# Patient Record
Sex: MALE | Race: WHITE | NOT HISPANIC OR LATINO | Employment: UNEMPLOYED | URBAN - NONMETROPOLITAN AREA
[De-identification: names, ages, dates, MRNs, and addresses within clinical notes are randomized per-mention and may not be internally consistent; named-entity substitution may affect disease eponyms.]

---

## 2022-06-11 ENCOUNTER — APPOINTMENT (OUTPATIENT)
Dept: CT IMAGING | Facility: HOSPITAL | Age: 52
End: 2022-06-11

## 2022-06-11 ENCOUNTER — APPOINTMENT (OUTPATIENT)
Dept: GENERAL RADIOLOGY | Facility: HOSPITAL | Age: 52
End: 2022-06-11

## 2022-06-11 ENCOUNTER — HOSPITAL ENCOUNTER (EMERGENCY)
Facility: HOSPITAL | Age: 52
Discharge: HOME | End: 2022-06-11
Attending: STUDENT IN AN ORGANIZED HEALTH CARE EDUCATION/TRAINING PROGRAM | Admitting: STUDENT IN AN ORGANIZED HEALTH CARE EDUCATION/TRAINING PROGRAM

## 2022-06-11 VITALS
SYSTOLIC BLOOD PRESSURE: 157 MMHG | DIASTOLIC BLOOD PRESSURE: 69 MMHG | RESPIRATION RATE: 16 BRPM | BODY MASS INDEX: 41.75 KG/M2 | HEIGHT: 73 IN | OXYGEN SATURATION: 95 % | HEART RATE: 75 BPM | WEIGHT: 315 LBS | TEMPERATURE: 98 F

## 2022-06-11 DIAGNOSIS — F41.9 ANXIETY: ICD-10-CM

## 2022-06-11 DIAGNOSIS — R10.84 GENERALIZED ABDOMINAL PAIN: Primary | ICD-10-CM

## 2022-06-11 LAB
ALBUMIN SERPL-MCNC: 4.7 G/DL (ref 3.5–5.2)
ALBUMIN/GLOB SERPL: 1.6 G/DL
ALP SERPL-CCNC: 69 U/L (ref 39–117)
ALT SERPL W P-5'-P-CCNC: 54 U/L (ref 1–41)
ANION GAP SERPL CALCULATED.3IONS-SCNC: 17 MMOL/L (ref 5–15)
AST SERPL-CCNC: 39 U/L (ref 1–40)
BASOPHILS # BLD AUTO: 0.06 10*3/MM3 (ref 0–0.2)
BASOPHILS NFR BLD AUTO: 0.8 % (ref 0–1.5)
BILIRUB SERPL-MCNC: 0.9 MG/DL (ref 0–1.2)
BILIRUB UR QL STRIP: NEGATIVE
BUN SERPL-MCNC: 14 MG/DL (ref 6–20)
BUN/CREAT SERPL: 13 (ref 7–25)
CALCIUM SPEC-SCNC: 9.8 MG/DL (ref 8.6–10.5)
CHLORIDE SERPL-SCNC: 101 MMOL/L (ref 98–107)
CLARITY UR: CLEAR
CO2 SERPL-SCNC: 20 MMOL/L (ref 22–29)
COLOR UR: YELLOW
CREAT SERPL-MCNC: 1.08 MG/DL (ref 0.76–1.27)
D DIMER PPP FEU-MCNC: 0.3 MCGFEU/ML (ref 0–0.5)
DEPRECATED RDW RBC AUTO: 41.9 FL (ref 37–54)
EGFRCR SERPLBLD CKD-EPI 2021: 83.6 ML/MIN/1.73
EOSINOPHIL # BLD AUTO: 0.25 10*3/MM3 (ref 0–0.4)
EOSINOPHIL NFR BLD AUTO: 3.5 % (ref 0.3–6.2)
ERYTHROCYTE [DISTWIDTH] IN BLOOD BY AUTOMATED COUNT: 12.7 % (ref 12.3–15.4)
GLOBULIN UR ELPH-MCNC: 2.9 GM/DL
GLUCOSE SERPL-MCNC: 106 MG/DL (ref 65–99)
GLUCOSE UR STRIP-MCNC: NEGATIVE MG/DL
HCT VFR BLD AUTO: 49.2 % (ref 37.5–51)
HGB BLD-MCNC: 15.7 G/DL (ref 13–17.7)
HGB UR QL STRIP.AUTO: NEGATIVE
IMM GRANULOCYTES # BLD AUTO: 0.01 10*3/MM3 (ref 0–0.05)
IMM GRANULOCYTES NFR BLD AUTO: 0.1 % (ref 0–0.5)
KETONES UR QL STRIP: ABNORMAL
LEUKOCYTE ESTERASE UR QL STRIP.AUTO: NEGATIVE
LIPASE SERPL-CCNC: 16 U/L (ref 13–60)
LYMPHOCYTES # BLD AUTO: 2.39 10*3/MM3 (ref 0.7–3.1)
LYMPHOCYTES NFR BLD AUTO: 33.6 % (ref 19.6–45.3)
MCH RBC QN AUTO: 29.1 PG (ref 26.6–33)
MCHC RBC AUTO-ENTMCNC: 31.9 G/DL (ref 31.5–35.7)
MCV RBC AUTO: 91.1 FL (ref 79–97)
MONOCYTES # BLD AUTO: 0.69 10*3/MM3 (ref 0.1–0.9)
MONOCYTES NFR BLD AUTO: 9.7 % (ref 5–12)
NEUTROPHILS NFR BLD AUTO: 3.72 10*3/MM3 (ref 1.7–7)
NEUTROPHILS NFR BLD AUTO: 52.3 % (ref 42.7–76)
NITRITE UR QL STRIP: NEGATIVE
NRBC BLD AUTO-RTO: 0 /100 WBC (ref 0–0.2)
PH UR STRIP.AUTO: <=5 [PH] (ref 5–8)
PLATELET # BLD AUTO: 238 10*3/MM3 (ref 140–450)
PMV BLD AUTO: 10.7 FL (ref 6–12)
POTASSIUM SERPL-SCNC: 3.7 MMOL/L (ref 3.5–5.2)
PROT SERPL-MCNC: 7.6 G/DL (ref 6–8.5)
PROT UR QL STRIP: NEGATIVE
QT INTERVAL: 382 MS
QTC INTERVAL: 472 MS
RBC # BLD AUTO: 5.4 10*6/MM3 (ref 4.14–5.8)
SARS-COV-2 RNA PNL SPEC NAA+PROBE: NOT DETECTED
SODIUM SERPL-SCNC: 138 MMOL/L (ref 136–145)
SP GR UR STRIP: 1.01 (ref 1–1.03)
TROPONIN T SERPL-MCNC: <0.01 NG/ML (ref 0–0.03)
UROBILINOGEN UR QL STRIP: ABNORMAL
WBC NRBC COR # BLD: 7.12 10*3/MM3 (ref 3.4–10.8)

## 2022-06-11 PROCEDURE — 93010 ELECTROCARDIOGRAM REPORT: CPT | Performed by: INTERNAL MEDICINE

## 2022-06-11 PROCEDURE — 87635 SARS-COV-2 COVID-19 AMP PRB: CPT | Performed by: STUDENT IN AN ORGANIZED HEALTH CARE EDUCATION/TRAINING PROGRAM

## 2022-06-11 PROCEDURE — 71250 CT THORAX DX C-: CPT

## 2022-06-11 PROCEDURE — 99283 EMERGENCY DEPT VISIT LOW MDM: CPT

## 2022-06-11 PROCEDURE — 74176 CT ABD & PELVIS W/O CONTRAST: CPT

## 2022-06-11 PROCEDURE — 81003 URINALYSIS AUTO W/O SCOPE: CPT | Performed by: STUDENT IN AN ORGANIZED HEALTH CARE EDUCATION/TRAINING PROGRAM

## 2022-06-11 PROCEDURE — 85025 COMPLETE CBC W/AUTO DIFF WBC: CPT | Performed by: STUDENT IN AN ORGANIZED HEALTH CARE EDUCATION/TRAINING PROGRAM

## 2022-06-11 PROCEDURE — 84484 ASSAY OF TROPONIN QUANT: CPT | Performed by: STUDENT IN AN ORGANIZED HEALTH CARE EDUCATION/TRAINING PROGRAM

## 2022-06-11 PROCEDURE — 93005 ELECTROCARDIOGRAM TRACING: CPT | Performed by: STUDENT IN AN ORGANIZED HEALTH CARE EDUCATION/TRAINING PROGRAM

## 2022-06-11 PROCEDURE — 80053 COMPREHEN METABOLIC PANEL: CPT | Performed by: STUDENT IN AN ORGANIZED HEALTH CARE EDUCATION/TRAINING PROGRAM

## 2022-06-11 PROCEDURE — 71045 X-RAY EXAM CHEST 1 VIEW: CPT

## 2022-06-11 PROCEDURE — 83690 ASSAY OF LIPASE: CPT | Performed by: STUDENT IN AN ORGANIZED HEALTH CARE EDUCATION/TRAINING PROGRAM

## 2022-06-11 PROCEDURE — 36415 COLL VENOUS BLD VENIPUNCTURE: CPT

## 2022-06-11 PROCEDURE — 85379 FIBRIN DEGRADATION QUANT: CPT | Performed by: STUDENT IN AN ORGANIZED HEALTH CARE EDUCATION/TRAINING PROGRAM

## 2022-06-11 RX ADMIN — SODIUM CHLORIDE, POTASSIUM CHLORIDE, SODIUM LACTATE AND CALCIUM CHLORIDE 1000 ML: 600; 310; 30; 20 INJECTION, SOLUTION INTRAVENOUS at 04:36

## 2022-06-11 NOTE — DISCHARGE INSTRUCTIONS
It was very nice to meet you, Keith. Thank you for allowing us to take care of you today at [facility]. Please be aware that you were seen during a time of global shortage of iodinated contrast media. This means an alternative approach to your diagnosis and treatment may have been employed in order to provide optimal care during this shortage.    You were evaluated in the ER for multiple complaints including abdominal pain, chest pain, and blood pressure issues. Your work-up today did not show any emergent findings or emergent indications for admission to the hospital. While it is unclear what exactly is the cause of your symptoms, please understand that an ER evaluation is considered to be just the start of your evaluation. We will do what we can in one visit, but we are often unable to fully figure out what is causing your symptoms from one evaluation. Thus our primary goal is to determine whether you need to be evaluated in the hospital or if it is safe for you to go home and see other doctors such as a primary care physician or a specialist on an outpatient basis. A copy of your results should be included in your paperwork.     It is VERY IMPORTANT that you follow up (call them to set up an appointment) with your primary care doctor* within the next few days or as soon as possible so that you can be re-evaluated for improvement in your symptoms or for any other questions. If you were prescribed any medications, please take them as directed or call us back with any questions.     Please return to the emergency room within 12-48 hours if you experience fever, chills, chest pain or shortness of breath, pain with inspiration/expiration, pain that travels to your arms, neck or back, nausea, vomiting, severe headache, tearing pain in your chest, dizziness, feel as though you are about to pass out, have any worsening symptoms, or any other concerns.

## 2022-06-11 NOTE — ED NOTES
"Upon Dr. Patterson discharging the patient, the patient began getting hostile stating \"You're telling me there's nothing wrong with me when I've been feeling like this for the past 2 weeks\". Pt was informed there was nothing found that required emergency intervention. Pt began yelling \"Well I want another dr\" and using profanities. Security called to stand outside door. When this nurse entered room to discharge patient, he continued to say \"where's the other nearest hospital? I guess this is one of the hospitals that think just cause I'm not bleeding out doesn't mean its not important\" Education provided. Pt discharged peacefully.   "

## 2022-06-24 NOTE — ED PROVIDER NOTES
Subjective   Patient states that he has been having abdominal pain for 2 weeks.  He states that he has been trying enemas because nothing is coming out.  States that he is also had significantly decreased oral intake.  States that he is not sure what is going on but he needs answers today.  Denies any chest pain, shortness of breath, hematuria, hematochezia, melena.  He states that he did not have anywhere to take his dog so he brought it into the ER with him.  States that he is not having any numbness, tingling, saddle paresthesias, or weight loss.  He states that he has a history of small bowel obstruction and also is having mild shortness of breath.          Review of Systems   All other systems reviewed and are negative.      History reviewed. No pertinent past medical history.    Allergies   Allergen Reactions   • Penicillins Rash       History reviewed. No pertinent surgical history.    History reviewed. No pertinent family history.    Social History     Socioeconomic History   • Marital status:            Objective   Physical Exam  Vitals and nursing note reviewed.   Constitutional:       General: He is not in acute distress.     Appearance: He is not toxic-appearing or diaphoretic.   HENT:      Head: Normocephalic and atraumatic.      Nose: Nose normal.   Eyes:      General:         Right eye: No discharge.         Left eye: No discharge.      Extraocular Movements: Extraocular movements intact.      Conjunctiva/sclera: Conjunctivae normal.   Cardiovascular:      Rate and Rhythm: Normal rate and regular rhythm.      Pulses: Normal pulses.   Pulmonary:      Effort: Pulmonary effort is normal. No respiratory distress.      Breath sounds: No stridor. No rhonchi.   Abdominal:      General: Abdomen is flat.      Tenderness: There is abdominal tenderness (mild, generalized). There is no guarding or rebound.   Musculoskeletal:         General: Normal range of motion.      Cervical back: Normal range of  motion. No rigidity.   Skin:     General: Skin is warm.      Capillary Refill: Capillary refill takes less than 2 seconds.   Neurological:      General: No focal deficit present.      Mental Status: He is alert and oriented to person, place, and time.      Cranial Nerves: No cranial nerve deficit.      Sensory: No sensory deficit.      Motor: No weakness.   Psychiatric:         Thought Content: Thought content normal.         Judgment: Judgment normal.      Comments: Hostile mood and splitting present         Procedures           ED Course                                           MDM  Number of Diagnoses or Management Options  Anxiety  Generalized abdominal pain  Diagnosis management comments: This is a 52-year-old male presenting today with multiple complaints including shortness of breath, constipation and anxiety.  Given his history and physical examination plan to obtain a CBC, CMP, lipase, UA as well as an EKG, troponin, CT chest, CT abdomen pelvis without contrast. This patient was evaluated during a time of global shortage of iodinated contrast media. Based on guidance from the [employer], best practices, and local institutional approaches an alternative path for evaluating and managing the patient may have been employed in order to provide optimal care during this shortage. The current situation has been discussed with the patient.  His work-up is largely unremarkable.  Patient is demanding answers tonight for symptoms that been going on for longer than 2 weeks.  He has began to get very hostile towards staff and is using profanities and racial slurs.  I told him that we have evaluated for multiple emergent medical conditions that we have not found any evidence of so far. I reassessed the patient and discussed the findings of the work up so far. I told him that if his symptoms worsen or he has other questions he needs to return to the ER. I answered all his questions regarding the  emergency department evaluation, diagnosis, and treatment plan in plain and simple language that he was able to understand.     He voiced agreement with the plan of care so far and had no further questions. I told him that there is always some diagnostic uncertainty in the ER and that his work up, physical exam, and even his current presentation may not always reveal other underlying conditions. I also went over the fact that his condition may change or show itself after being discharged. He expressed understanding and agreed that there are reasonable limitations with the practice of emergency medicine.    I gave him return precautions and told him to return to the emergency department within 24 - 48hrs if he has any new, worsening, or concerning symptoms.     I told him that it is VERY IMPORTANT that he follows up (by calling to set up an appointment) with his primary care doctor within the next few days or as soon as reasonably possible so that he can be re-evaluated for improvement in his symptoms or for any other questions. He verbalized understanding of these instructions.     He was discharged in stable condition and was observed ambulating out of the ER.          Amount and/or Complexity of Data Reviewed  Clinical lab tests: reviewed and ordered  Tests in the radiology section of CPT®: ordered and reviewed  Tests in the medicine section of CPT®: reviewed        Final diagnoses:   Generalized abdominal pain   Anxiety       ED Disposition  ED Disposition     ED Disposition   Discharge    Condition   Stable    Comment   --             Provider, No Known  [facility]  [place] [address]  [phone]    Call in 1 day  As needed, If symptoms worsen         Medication List      No changes were made to your prescriptions during this visit.          Dorian Patterson MD  06/23/22 3433

## 2023-02-15 ENCOUNTER — TELEPHONE (OUTPATIENT)
Dept: SURGERY | Age: 53
End: 2023-02-15

## 2023-02-15 ENCOUNTER — HOSPITAL ENCOUNTER (EMERGENCY)
Age: 53
Discharge: HOME | End: 2023-02-15
Attending: EMERGENCY MEDICINE
Payer: MEDICAID

## 2023-02-15 ENCOUNTER — APPOINTMENT (OUTPATIENT)
Dept: CT IMAGING | Age: 53
End: 2023-02-15
Payer: MEDICAID

## 2023-02-15 VITALS
WEIGHT: 260 LBS | SYSTOLIC BLOOD PRESSURE: 152 MMHG | DIASTOLIC BLOOD PRESSURE: 93 MMHG | HEIGHT: 73 IN | BODY MASS INDEX: 34.46 KG/M2 | OXYGEN SATURATION: 93 % | RESPIRATION RATE: 18 BRPM | TEMPERATURE: 98.2 F | HEART RATE: 92 BPM

## 2023-02-15 DIAGNOSIS — R10.9 RECURRENT ABDOMINAL PAIN: Primary | ICD-10-CM

## 2023-02-15 LAB
ALBUMIN SERPL-MCNC: 4.3 G/DL (ref 3.5–5.2)
ALP BLD-CCNC: 83 U/L (ref 40–130)
ALT SERPL-CCNC: 20 U/L (ref 5–41)
ANION GAP SERPL CALCULATED.3IONS-SCNC: 11 MMOL/L (ref 7–19)
AST SERPL-CCNC: 17 U/L (ref 5–40)
BASOPHILS ABSOLUTE: 0.1 K/UL (ref 0–0.2)
BASOPHILS RELATIVE PERCENT: 0.8 % (ref 0–1)
BILIRUB SERPL-MCNC: 0.7 MG/DL (ref 0.2–1.2)
BILIRUBIN URINE: NEGATIVE
BLOOD, URINE: NEGATIVE
BUN BLDV-MCNC: 15 MG/DL (ref 6–20)
CALCIUM SERPL-MCNC: 9.2 MG/DL (ref 8.6–10)
CHLORIDE BLD-SCNC: 106 MMOL/L (ref 98–111)
CLARITY: CLEAR
CO2: 23 MMOL/L (ref 22–29)
COLOR: YELLOW
CREAT SERPL-MCNC: 1.1 MG/DL (ref 0.5–1.2)
EKG P AXIS: 47 DEGREES
EKG P-R INTERVAL: 194 MS
EKG Q-T INTERVAL: 394 MS
EKG QRS DURATION: 102 MS
EKG QTC CALCULATION (BAZETT): 416 MS
EKG T AXIS: 24 DEGREES
EOSINOPHILS ABSOLUTE: 0.2 K/UL (ref 0–0.6)
EOSINOPHILS RELATIVE PERCENT: 2.5 % (ref 0–5)
GFR SERPL CREATININE-BSD FRML MDRD: >60 ML/MIN/{1.73_M2}
GLUCOSE BLD-MCNC: 102 MG/DL (ref 74–109)
GLUCOSE URINE: NEGATIVE MG/DL
HCT VFR BLD CALC: 46.7 % (ref 42–52)
HEMOGLOBIN: 15.7 G/DL (ref 14–18)
IMMATURE GRANULOCYTES #: 0 K/UL
KETONES, URINE: ABNORMAL MG/DL
LEUKOCYTE ESTERASE, URINE: NEGATIVE
LIPASE: 19 U/L (ref 13–60)
LYMPHOCYTES ABSOLUTE: 2.1 K/UL (ref 1.1–4.5)
LYMPHOCYTES RELATIVE PERCENT: 29.6 % (ref 20–40)
MCH RBC QN AUTO: 29 PG (ref 27–31)
MCHC RBC AUTO-ENTMCNC: 33.6 G/DL (ref 33–37)
MCV RBC AUTO: 86.3 FL (ref 80–94)
MONOCYTES ABSOLUTE: 0.8 K/UL (ref 0–0.9)
MONOCYTES RELATIVE PERCENT: 10.6 % (ref 0–10)
NEUTROPHILS ABSOLUTE: 4 K/UL (ref 1.5–7.5)
NEUTROPHILS RELATIVE PERCENT: 56.4 % (ref 50–65)
NITRITE, URINE: NEGATIVE
PDW BLD-RTO: 12.6 % (ref 11.5–14.5)
PH UA: 5 (ref 5–8)
PLATELET # BLD: 258 K/UL (ref 130–400)
PMV BLD AUTO: 10.3 FL (ref 9.4–12.4)
POTASSIUM SERPL-SCNC: 4.1 MMOL/L (ref 3.5–5)
PROTEIN UA: NEGATIVE MG/DL
RBC # BLD: 5.41 M/UL (ref 4.7–6.1)
SODIUM BLD-SCNC: 140 MMOL/L (ref 136–145)
SPECIFIC GRAVITY UA: 1.02 (ref 1–1.03)
TOTAL PROTEIN: 7.3 G/DL (ref 6.6–8.7)
UROBILINOGEN, URINE: 0.2 E.U./DL
WBC # BLD: 7.1 K/UL (ref 4.8–10.8)

## 2023-02-15 PROCEDURE — 6360000004 HC RX CONTRAST MEDICATION: Performed by: EMERGENCY MEDICINE

## 2023-02-15 PROCEDURE — 93005 ELECTROCARDIOGRAM TRACING: CPT | Performed by: EMERGENCY MEDICINE

## 2023-02-15 PROCEDURE — 83690 ASSAY OF LIPASE: CPT

## 2023-02-15 PROCEDURE — 81003 URINALYSIS AUTO W/O SCOPE: CPT

## 2023-02-15 PROCEDURE — 74177 CT ABD & PELVIS W/CONTRAST: CPT

## 2023-02-15 PROCEDURE — 85025 COMPLETE CBC W/AUTO DIFF WBC: CPT

## 2023-02-15 PROCEDURE — 2580000003 HC RX 258: Performed by: EMERGENCY MEDICINE

## 2023-02-15 PROCEDURE — 80053 COMPREHEN METABOLIC PANEL: CPT

## 2023-02-15 PROCEDURE — 99285 EMERGENCY DEPT VISIT HI MDM: CPT

## 2023-02-15 PROCEDURE — 36415 COLL VENOUS BLD VENIPUNCTURE: CPT

## 2023-02-15 RX ORDER — MAGNESIUM HYDROXIDE/ALUMINUM HYDROXICE/SIMETHICONE 120; 1200; 1200 MG/30ML; MG/30ML; MG/30ML
5 SUSPENSION ORAL EVERY 6 HOURS PRN
COMMUNITY
End: 2023-02-16 | Stop reason: ALTCHOICE

## 2023-02-15 RX ORDER — SODIUM CHLORIDE, SODIUM LACTATE, POTASSIUM CHLORIDE, AND CALCIUM CHLORIDE .6; .31; .03; .02 G/100ML; G/100ML; G/100ML; G/100ML
1000 INJECTION, SOLUTION INTRAVENOUS ONCE
Status: COMPLETED | OUTPATIENT
Start: 2023-02-15 | End: 2023-02-15

## 2023-02-15 RX ADMIN — SODIUM CHLORIDE, POTASSIUM CHLORIDE, SODIUM LACTATE AND CALCIUM CHLORIDE 1000 ML: 600; 310; 30; 20 INJECTION, SOLUTION INTRAVENOUS at 07:31

## 2023-02-15 RX ADMIN — IOPAMIDOL 70 ML: 755 INJECTION, SOLUTION INTRAVENOUS at 08:09

## 2023-02-15 ASSESSMENT — ENCOUNTER SYMPTOMS
ABDOMINAL PAIN: 1
BLOOD IN STOOL: 0
DIARRHEA: 1
CONSTIPATION: 0
SHORTNESS OF BREATH: 0
SORE THROAT: 0
NAUSEA: 0
EYE DISCHARGE: 0
APNEA: 0
FACIAL SWELLING: 0
VOICE CHANGE: 0
SINUS PRESSURE: 0
CHOKING: 0

## 2023-02-15 NOTE — DISCHARGE INSTRUCTIONS
Suggest you continue an acid reducer until follow-up and further evaluation. Establish with primary care such as [facility]. You can also make an appointment with gastroenterology. You will probably need ultrasound and HIDA scan and possibly endoscopy. Return to the emergency department if bleeding or fever. Or intractable vomiting such as an obstruction.
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
## 2023-02-15 NOTE — ED NOTES
Pt is gowned. Patient placed on cardiac monitor, continuous pulse oximeter, and NIBP monitor.  Monitor alarms on.       Jean Pierre Mathias RN  02/15/23 8923

## 2023-02-16 ENCOUNTER — OFFICE VISIT (OUTPATIENT)
Dept: GASTROENTEROLOGY | Age: 53
End: 2023-02-16
Payer: MEDICAID

## 2023-02-16 VITALS
SYSTOLIC BLOOD PRESSURE: 160 MMHG | BODY MASS INDEX: 32.98 KG/M2 | OXYGEN SATURATION: 98 % | HEART RATE: 104 BPM | WEIGHT: 257 LBS | HEIGHT: 74 IN | DIASTOLIC BLOOD PRESSURE: 60 MMHG

## 2023-02-16 DIAGNOSIS — R13.10 DYSPHAGIA, UNSPECIFIED TYPE: Primary | ICD-10-CM

## 2023-02-16 DIAGNOSIS — R10.30 LOWER ABDOMINAL PAIN: ICD-10-CM

## 2023-02-16 DIAGNOSIS — K21.9 GASTROESOPHAGEAL REFLUX DISEASE, UNSPECIFIED WHETHER ESOPHAGITIS PRESENT: ICD-10-CM

## 2023-02-16 PROCEDURE — 99204 OFFICE O/P NEW MOD 45 MIN: CPT | Performed by: NURSE PRACTITIONER

## 2023-02-16 RX ORDER — OMEPRAZOLE 40 MG/1
40 CAPSULE, DELAYED RELEASE ORAL DAILY
Qty: 30 CAPSULE | Refills: 11 | Status: SHIPPED | OUTPATIENT
Start: 2023-02-16

## 2023-02-16 ASSESSMENT — ENCOUNTER SYMPTOMS
COUGH: 0
ABDOMINAL PAIN: 1
NAUSEA: 0
DIARRHEA: 0
ABDOMINAL DISTENTION: 0
VOMITING: 0
SHORTNESS OF BREATH: 0
RECTAL PAIN: 0
CONSTIPATION: 0
BLOOD IN STOOL: 0
TROUBLE SWALLOWING: 0
ANAL BLEEDING: 0
CHOKING: 0
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
## 2023-02-16 NOTE — PATIENT INSTRUCTIONS
Schedule colonoscopy and endoscopy. Do not eat or drink after midnight the day of the procedure. Allowed medications can be taken with a small sip of water. Please review your prep instructions for allowed medications. You will not be able to drive for 24 hours after the procedure due to sedation. Must have a responsible adult, 18 years or older, accompany you to drive you home the day of your procedure. If you are on blood thinners, clearance from the prescribing physician will be obtained before your procedure is scheduled. If it is determined it is not safe to hold these medications for a short time an alternative procedure for evaluation may be recommended. No aspirin, ibuprofen, naproxen, fish oil or vitamin E for 5 days before procedure. Risks of colonoscopy and endoscopy include, but are not limited to, perforation, bleeding, and infection, Risk of perforation and bleeding are increased if there is a polyp removed or dilation completed. Anesthesia risks will be reviewed with you before the procedure by a member of the anesthesia department. Your physician may also schedule a follow up appointment with the nurse practitioner to discuss pathology, symptoms or to check if you have had any problems related to your procedure. If you prefer not to return to the office after your procedure please discuss this with your physician on the day of your colonoscopy. The physician will talk with you and/or your family after the procedure is completed. Final recommendations are based on the pathologist report if biopsies or specimens are taken. If polyps are removed during the procedure they will be sent to a pathologist for analysis. Unless you have a follow up appointment scheduled, you will be notified by mail of the pathology results within 4 weeks. If you have not received results after 4 weeks you may call the office to obtain this information. For Colonoscopy:   You will be given specific directions regarding restrictions to diet and bowel prep instructions including laxatives. Please read these instructions one week prior to your scheduled procedure to ensure that you are prepared. If you have any questions regarding these instructions please call our office Mon through Fri from 8:00 am to 4:00 pm.     Follow prep instructions provided for bowel prep. Take all of the bowel prep as directed. If you are having problems with nausea, stop your prep for 30-45 min to allow the nausea to subside before resuming your prep. It is important to drink plenty of fluids throughout the day before taking your laxatives. This will help to protect your kidneys, prevent dehydration and maximize the effect of the bowel prep. Your diet before a colonoscopy bowel preparation is very important to ensure a successful colon exam. It is recommended to consider certain changes to your diet three to four days prior to the procedure. Remember that your bowels need to be completely empty for the exam.    What foods are good to eat? Cut down on heavy solid foods three to four days before the procedure and start introducing lighter meals to your diet. The following food suggestions are a good part of your diet before a colonoscopy bowel preparation. Light meat that is easily digestible such as chicken (without the skin)   Potatoes without skin   Cheese   Eggs   A light meal of steamed white fish   Light clear soups    Foods and drinks to avoid  Avoid foods that contain too much fiber. Stay clear of dark colored beverages. They can stick to the walls of the digestive tract and make it difficult to differentiate from blood.  Some of these foods are:  Red meat, rice, nuts and vegetables   Milk, other milk based fluids and cream   Most fruit and puddings   Whole grain pasta   Cereals, bran and seeds   Colored beverages, especially those that are red or purple in color   Red colored Jell-O   On the day before the colonoscopy, continue to drink plenty of clear fluids. It is important   to keep yourself hydrated before the exam.     Please follow all instructions as provided for cleansing the bowel. Failure to have an adequately prepped colon may cause you to have incomplete exam with further testing required.      [email or web address]

## 2023-03-06 ENCOUNTER — TELEMEDICINE (OUTPATIENT)
Dept: GASTROENTEROLOGY | Age: 53
End: 2023-03-06
Payer: MEDICAID

## 2023-03-06 DIAGNOSIS — R13.10 DYSPHAGIA, UNSPECIFIED TYPE: Primary | ICD-10-CM

## 2023-03-06 DIAGNOSIS — R10.13 EPIGASTRIC PAIN: ICD-10-CM

## 2023-03-06 PROCEDURE — 99214 OFFICE O/P EST MOD 30 MIN: CPT | Performed by: NURSE PRACTITIONER

## 2023-03-06 ASSESSMENT — ENCOUNTER SYMPTOMS
SHORTNESS OF BREATH: 0
VOMITING: 0
COUGH: 0
ABDOMINAL DISTENTION: 0
ANAL BLEEDING: 0
TROUBLE SWALLOWING: 1
DIARRHEA: 0
CONSTIPATION: 0
CHOKING: 0
NAUSEA: 0
BLOOD IN STOOL: 0
ABDOMINAL PAIN: 0
RECTAL PAIN: 0

## 2023-03-06 NOTE — PROGRESS NOTES
Florencio Esqueda, was evaluated through a synchronous (real-time) audio-video encounter. The patient (or guardian if applicable) is aware that this is a billable service, which includes applicable co-pays. This Virtual Visit was conducted with patient's (and/or legal guardian's) consent. The visit was conducted pursuant to the emergency declaration under the [facility], [facility] authority and the [employer] and [email or web address] General Act. Patient identification was verified, and a caregiver was present when appropriate. The patient was located at Home: [address] [place] [id number]  Provider was located at [facility] ([place] [address]): [address] [place] [address] on 3/6/2023 at 11:45 AM    An electronic signature was used to authenticate this note. Subjective:     Patient ID: Florencio Esqueda is a 50 y.o. male  PCP: No primary care provider on file. Referring Provider: No ref. provider found    HPI  Patient presents to the office today with the following complaints: Dysphagia    Patient seen per video visit, states he was unable complete the prep for his colonoscopy, states he got very nauseated and vomited and was not able to hold the prep down. He does report the right sided pain he was feeling has resolved. Reports he would like to reschedule the egd, but he is not able to prep for the colonoscopy. Reports he his having very dark stools almost black. Reports that he is having difficulty swallowing also and he has had to bring food back up that would not go down. Assessment:     1. Dysphagia, unspecified type  2. Epigastric pain         Plan:   Schedule EGD  Nothing to eat or drink after midnight. No driving for 24 hours after procedure. Bring a  to procedure. No aspirin, NSAIDs, fish oil 5 days before procedure.   I have discussed the benefits, alternatives, and risks (including bleeding, perforation and death)  for pursuing Endoscopy (EGD/Colonscopy/EUS/ERCP) with the patient and they are willing to continue. We also discussed the need for anesthesia, IV access, proper dietary changes, medication changes if necessary, and need for bowel prep (if ordered) prior to their Endoscopic procedure. They are aware they must have someone accompany them to their scheduled procedure to drive them home - they agree to the above and are willing to continue. Orders  No orders of the defined types were placed in this encounter. Medications  No orders of the defined types were placed in this encounter. Patient History:     No past medical history on file. No past surgical history on file. Family History   Problem Relation Age of Onset    Cancer Mother 39        brain    Lung Cancer Mother     Cancer Brother 39    Lung Cancer Brother     Colon Cancer Neg Hx     Colon Polyps Neg Hx        Social History     Socioeconomic History    Marital status:    Tobacco Use    Smoking status: Former     Types: Cigarettes     Quit date: 2022     Years since quittin.8    Smokeless tobacco: Never   Vaping Use    Vaping Use: Former   Substance and Sexual Activity    Alcohol use: Not Currently     Comment: not since 26 yrs of age    Drug use: Never       Current Outpatient Medications   Medication Sig Dispense Refill    Alum & Mag Hydroxide-Simeth (MYLANTA PO) Take by mouth daily With every meal      omeprazole (PRILOSEC) 40 MG delayed release capsule Take 1 capsule by mouth daily Take first thing daily on an empty stomach. 30 capsule 11    bismuth subsalicylate (PEPTO BISMOL) 262 MG/15ML suspension Take 15 mLs by mouth every 6 hours as needed for Indigestion       No current facility-administered medications for this visit. Allergies   Allergen Reactions    Penicillins Rash     As a child       Review of Systems   Constitutional:  Negative for activity change, appetite change, fatigue, fever and unexpected weight change.   HENT:  Positive for trouble swallowing.    Respiratory:  Negative for cough, choking and shortness of breath.    Cardiovascular:  Negative for chest pain.   Gastrointestinal:  Negative for abdominal distention, abdominal pain, anal bleeding, blood in stool, constipation, diarrhea, nausea, rectal pain and vomiting.   Allergic/Immunologic: Negative for food allergies.   All other systems reviewed and are negative.      Objective:     There were no vitals taken for this visit.    Physical Exam  Vitals reviewed.   Constitutional:       General: He is not in acute distress.     Appearance: He is well-developed.   HENT:      Head: Normocephalic and atraumatic.      Right Ear: External ear normal.      Left Ear: External ear normal.      Nose: Nose normal.   Eyes:      General: No scleral icterus.        Right eye: No discharge.         Left eye: No discharge.      Conjunctiva/sclera: Conjunctivae normal.      Pupils: Pupils are equal, round, and reactive to light.   Cardiovascular:      Heart sounds: No murmur heard.  Pulmonary:      Effort: Pulmonary effort is normal. No respiratory distress.      Breath sounds: No wheezing or rales.   Abdominal:      General: There is no distension.      Palpations: There is no mass.      Tenderness: There is no abdominal tenderness. There is no guarding or rebound.   Musculoskeletal:         General: Normal range of motion.      Cervical back: Normal range of motion and neck supple.   Skin:     Coloration: Skin is not pale.   Neurological:      Mental Status: He is alert and oriented to person, place, and time.   Psychiatric:         Behavior: Behavior normal.

## 2023-03-06 NOTE — PATIENT INSTRUCTIONS
Upper GI Endoscopy: Before Your Procedure    What is an upper GI endoscopy? An upper gastrointestinal (or GI) endoscopy is a test that allows your doctor to look at the inside of your esophagus, stomach, and the first part of your small intestine, called the duodenum. The esophagus is the tube that carries food to your stomach. The doctor uses a thin, lighted tube that bends. It is called an endoscope, or scope. The doctor puts the tip of the scope in your mouth and gently moves it down your throat. The scope is a flexible video camera. The doctor looks at a monitor (like a TV set or a computer screen) as he or she moves the scope. A doctor may do this procedure to look for ulcers, tumors, infection, or bleeding. It also can be used to look for signs of acid backing up into your esophagus. This is called gastroesophageal reflux disease, or GERD. The doctor can use the scope to take a sample of tissue for study (a biopsy). The doctor also can use the scope to take out growths or stop bleeding. How do you prepare for the procedure? Procedures can be stressful. This information will help you understand what you can expect. And it will help you safely prepare for your procedure. Preparing for the procedure    Do not eat or drink anything for 6 to 8 hours before the test. An empty stomach helps your doctor see your stomach clearly during the test. It also reduces your chances of vomiting. If you vomit, there is a small risk that the vomit could enter your lungs. (This is called aspiration.) If the test is done in an emergency, a tube may be inserted through your nose or mouth to empty your stomach. Do not take sucralfate (Carafate) or antacids on the day of the test. These medicines can make it hard for your doctor to see your upper GI tract. If your doctor tells you to, stop taking iron supplements 7 to 14 days before the test.     Be sure you have someone to take you home.  Anesthesia and pain medicine will make it unsafe for you to drive or get home on your own. Understand exactly what procedure is planned, along with the risks, benefits, and other options. Tell your doctor ALL the medicines, vitamins, supplements, and herbal remedies you take. Some may increase the risk of problems during your procedure. Your doctor will tell you if you should stop taking any of them before the procedure and how soon to do it. If you take a medicine that prevents blood clots, your doctor may tell you to stop taking it before your procedure. Or your doctor may tell you to keep taking it. (These medicines include aspirin and other blood thinners.) Make sure that you understand exactly what your doctor wants you to do. Make sure your doctor and the hospital have a copy of your advance directive. If you don't have one, you may want to prepare one. It lets others know your health care wishes. It's a good thing to have before any type of surgery or procedure. What happens on the day of the procedure? Follow the instructions exactly about when to stop eating and drinking. If you don't, your procedure may be canceled. If your doctor told you to take your medicines on the day of the procedure, take them with only a sip of water. Take a bath or shower before you come in for your procedure. Do not apply lotions, perfumes, deodorants, or nail polish. Take off all jewelry and piercings. And take out contact lenses, if you wear them. At the hospital or surgery center   Bring a picture ID. The test may take 15 to 30 minutes. The doctor may spray medicine on the back of your throat to numb it. You also will get medicine to prevent pain and to relax you. You will lie on your left side. The doctor will put the scope in your mouth and toward the back of your throat. The doctor will tell you when to swallow. This helps the scope move down your throat. You will be able to breathe normally.  The doctor will move the scope down your esophagus into your stomach. The doctor also may look at the duodenum. If your doctor wants to take a sample of tissue for a biopsy, he or she may use small surgical tools, which are put into the scope, to cut off some tissue. You will not feel a biopsy, if one is taken. The doctor also can use the tools to stop bleeding or to do other treatments, if needed. You will stay at the hospital or surgery center for 1 to 2 hours until the medicine you were given wears off. What happens after an upper GI endoscopy? After the test, you may belch and feel bloated for a while. You may have a tickling, dry throat or mouth. You may feel a bit hoarse, and you may have a mild sore throat. These symptoms may last several days. Throat lozenges and warm saltwater gargles can help relieve the throat symptoms. Ask your doctor when you can drive again. Your doctor will tell you when you can go back to your usual diet and activities. Don't drink alcohol for 12 to 24 hours after the test.   When should you call your doctor? You have questions or concerns. You don't understand how to prepare for your procedure. You become ill before the procedure (such as fever, flu, or a cold). You need to reschedule or have changed your mind about having the procedure. Where can you learn more? Go to http://www.woods.com/ and enter P790 to learn more about \"Upper GI Endoscopy: Before Your Procedure. \"  Current as of: June 6, 2022               Content Version: 13.5  © 2006-2022 [employer]. Care instructions adapted under license by [facility] ([facility]). If you have questions about a medical condition or this instruction, always ask your healthcare professional. [place] [address] any warranty or liability for your use of this information.

## 2023-03-14 ENCOUNTER — ANESTHESIA EVENT (OUTPATIENT)
Dept: OPERATING ROOM | Age: 53
End: 2023-03-14

## 2023-03-14 ENCOUNTER — TELEPHONE (OUTPATIENT)
Dept: GASTROENTEROLOGY | Age: 53
End: 2023-03-14

## 2023-03-14 ENCOUNTER — HOSPITAL ENCOUNTER (OUTPATIENT)
Age: 53
Setting detail: OUTPATIENT SURGERY
Discharge: HOME | End: 2023-03-14
Attending: INTERNAL MEDICINE | Admitting: INTERNAL MEDICINE
Payer: MEDICAID

## 2023-03-14 ENCOUNTER — APPOINTMENT (OUTPATIENT)
Dept: OPERATING ROOM | Age: 53
End: 2023-03-14

## 2023-03-14 ENCOUNTER — ANESTHESIA (OUTPATIENT)
Dept: OPERATING ROOM | Age: 53
End: 2023-03-14

## 2023-03-14 ENCOUNTER — HOSPITAL ENCOUNTER (OUTPATIENT)
Age: 53
Setting detail: SPECIMEN
Discharge: HOME | End: 2023-03-14
Payer: MEDICAID

## 2023-03-14 VITALS
WEIGHT: 263 LBS | BODY MASS INDEX: 33.75 KG/M2 | DIASTOLIC BLOOD PRESSURE: 47 MMHG | HEART RATE: 68 BPM | TEMPERATURE: 97 F | RESPIRATION RATE: 16 BRPM | SYSTOLIC BLOOD PRESSURE: 104 MMHG | HEIGHT: 74 IN | OXYGEN SATURATION: 96 %

## 2023-03-14 PROCEDURE — 88305 TISSUE EXAM BY PATHOLOGIST: CPT

## 2023-03-14 PROCEDURE — 43239 EGD BIOPSY SINGLE/MULTIPLE: CPT

## 2023-03-14 PROCEDURE — 43239 EGD BIOPSY SINGLE/MULTIPLE: CPT | Performed by: INTERNAL MEDICINE

## 2023-03-14 PROCEDURE — 43450 DILATE ESOPHAGUS 1/MULT PASS: CPT

## 2023-03-14 PROCEDURE — 43450 DILATE ESOPHAGUS 1/MULT PASS: CPT | Performed by: INTERNAL MEDICINE

## 2023-03-14 RX ORDER — ONDANSETRON 2 MG/ML
4 INJECTION INTRAMUSCULAR; INTRAVENOUS
Status: CANCELLED | OUTPATIENT
Start: 2023-03-14 | End: 2023-03-15

## 2023-03-14 RX ORDER — SODIUM CHLORIDE, SODIUM LACTATE, POTASSIUM CHLORIDE, CALCIUM CHLORIDE 600; 310; 30; 20 MG/100ML; MG/100ML; MG/100ML; MG/100ML
INJECTION, SOLUTION INTRAVENOUS CONTINUOUS
Status: DISCONTINUED | OUTPATIENT
Start: 2023-03-14 | End: 2023-03-14 | Stop reason: HOSPADM

## 2023-03-14 RX ORDER — PROPOFOL 10 MG/ML
INJECTION, EMULSION INTRAVENOUS PRN
Status: DISCONTINUED | OUTPATIENT
Start: 2023-03-14 | End: 2023-03-14 | Stop reason: SDUPTHER

## 2023-03-14 RX ORDER — LIDOCAINE HYDROCHLORIDE 10 MG/ML
INJECTION, SOLUTION EPIDURAL; INFILTRATION; INTRACAUDAL; PERINEURAL PRN
Status: DISCONTINUED | OUTPATIENT
Start: 2023-03-14 | End: 2023-03-14 | Stop reason: SDUPTHER

## 2023-03-14 RX ADMIN — PROPOFOL 200 MG: 10 INJECTION, EMULSION INTRAVENOUS at 09:53

## 2023-03-14 RX ADMIN — LIDOCAINE HYDROCHLORIDE 30 MG: 10 INJECTION, SOLUTION EPIDURAL; INFILTRATION; INTRACAUDAL; PERINEURAL at 09:53

## 2023-03-14 RX ADMIN — SODIUM CHLORIDE, SODIUM LACTATE, POTASSIUM CHLORIDE, CALCIUM CHLORIDE: 600; 310; 30; 20 INJECTION, SOLUTION INTRAVENOUS at 08:53

## 2023-03-14 NOTE — ANESTHESIA PRE PROCEDURE
Department of Anesthesiology  Preprocedure Note       Name:  Fernando Garcia   Age:  50 y.o.  :  1970                                          MRN:  [id number]         Date:  3/14/2023      Surgeon: Umesh Arias):  Timmy Regalado MD    Procedure: Procedure(s):  EGD BIOPSY    Medications prior to admission:   Prior to Admission medications    Medication Sig Start Date End Date Taking? Authorizing Provider   Alum & Mag Hydroxide-Simeth (MYLANTA PO) Take by mouth daily With every meal    Historical Provider, MD   omeprazole (PRILOSEC) 40 MG delayed release capsule Take 1 capsule by mouth daily Take first thing daily on an empty stomach. 23   CessCAMILA Killian   bismuth subsalicylate (PEPTO BISMOL) 262 MG/15ML suspension Take 15 mLs by mouth every 6 hours as needed for Indigestion    Historical Provider, MD       Current medications:    Current Facility-Administered Medications   Medication Dose Route Frequency Provider Last Rate Last Admin    lactated ringers IV soln infusion   IntraVENous Continuous Timmy Regalado  mL/hr at 23 0853 New Bag at 23 0853       Allergies: Allergies   Allergen Reactions    Penicillins Rash     As a child       Problem List:  There is no problem list on file for this patient. Past Medical History:  No past medical history on file. Past Surgical History:  No past surgical history on file.     Social History:    Social History     Tobacco Use    Smoking status: Former     Types: Cigarettes     Quit date: 2022     Years since quittin.8    Smokeless tobacco: Never   Substance Use Topics    Alcohol use: Not Currently     Comment: not since 26 yrs of age                                Counseling given: Not Answered      Vital Signs (Current):   Vitals:    23 0845   BP: 129/69   Pulse: 96   Resp: 16   Temp: 97 °F (36.1 °C)   SpO2: 98%   Weight: 263 lb (119.3 kg)   Height: 6' 2\" (1.88 m) BP Readings from Last 3 Encounters:   03/14/23 129/69   02/16/23 (!) 160/60   02/15/23 (!) 152/93       NPO Status: Time of last liquid consumption: 1800                        Time of last solid consumption: 1600                        Date of last liquid consumption: 03/13/23                        Date of last solid food consumption: 03/13/23    BMI:   Wt Readings from Last 3 Encounters:   03/14/23 263 lb (119.3 kg)   02/16/23 257 lb (116.6 kg)   02/15/23 260 lb (117.9 kg)     Body mass index is 33.77 kg/m². CBC:   Lab Results   Component Value Date/Time    WBC 7.1 02/15/2023 07:26 AM    RBC 5.41 02/15/2023 07:26 AM    HGB 15.7 02/15/2023 07:26 AM    HCT 46.7 02/15/2023 07:26 AM    MCV 86.3 02/15/2023 07:26 AM    RDW 12.6 02/15/2023 07:26 AM     02/15/2023 07:26 AM       CMP:   Lab Results   Component Value Date/Time     02/15/2023 07:26 AM    K 4.1 02/15/2023 07:26 AM     02/15/2023 07:26 AM    CO2 23 02/15/2023 07:26 AM    BUN 15 02/15/2023 07:26 AM    CREATININE 1.1 02/15/2023 07:26 AM    LABGLOM >60 02/15/2023 07:26 AM    GLUCOSE 102 02/15/2023 07:26 AM    PROT 7.3 02/15/2023 07:26 AM    CALCIUM 9.2 02/15/2023 07:26 AM    BILITOT 0.7 02/15/2023 07:26 AM    ALKPHOS 83 02/15/2023 07:26 AM    AST 17 02/15/2023 07:26 AM    ALT 20 02/15/2023 07:26 AM       POC Tests: No results for input(s): POCGLU, POCNA, POCK, POCCL, POCBUN, POCHEMO, POCHCT in the last 72 hours.     Coags: No results found for: PROTIME, INR, APTT    HCG (If Applicable): No results found for: PREGTESTUR, PREGSERUM, HCG, HCGQUANT     ABGs: No results found for: PHART, PO2ART, WYE9KVT, KWP9SRO, BEART, N3SQZXUP     Type & Screen (If Applicable):  No results found for: LABABO, LABRH    Drug/Infectious Status (If Applicable):  No results found for: HIV, HEPCAB    COVID-19 Screening (If Applicable): No results found for: COVID19        Anesthesia Evaluation  Patient summary reviewed and Nursing notes reviewed  Airway: Mallampati: II  TM distance: >3 FB   Neck ROM: full  Mouth opening: > = 3 FB   Dental: normal exam         Pulmonary:Negative Pulmonary ROS and normal exam  breath sounds clear to auscultation                             Cardiovascular:Negative CV ROS            Rhythm: regular  Rate: normal           Beta Blocker:  Not on Beta Blocker         Neuro/Psych:   Negative Neuro/Psych ROS               ROS comment: PTSD  Occasional marijuana use GI/Hepatic/Renal: Neg GI/Hepatic/Renal ROS            Endo/Other: Negative Endo/Other ROS                    Abdominal:       Abdomen: soft. Vascular: negative vascular ROS. Other Findings:           Anesthesia Plan      general and TIVA     ASA 2       Induction: intravenous. Anesthetic plan and risks discussed with patient.                         CAMILA Cagle - CRNA   3/14/2023

## 2023-03-14 NOTE — DISCHARGE INSTRUCTIONS
POST-OP ORDERS: ENDOSCOPY & COLONOSCOPY:  1. Rest today. 2. DO NOT eat or drink until wide awake; eat your usual diet today in moderate amount only. 3. DO NOT drive today. 4. Call physician if you have severe pain, vomiting, fever, rectal bleeding or black bowel movements. 5.  If a biopsy was taken or a polyp removed, you should expect to hear results in about 21 days. If you have heard nothing from your physician by then, call the office for results. 6.  Discharge home when patient awake, vitals signs stable and tolerating liquids. 1.  Await path results, the patient will be contacted in 7-10 days with biopsy results. 2.  Magic mouthwash 5 ml PO Swish and swallow q3h PRN ONLY IF patient has post-procedural sorethroat or chest pain. 3. Full liquids to soft diet today huang discharge from the surgicenter; may advance  diet starting in AM tomorrow. 4. May resume other meds except any ASA/NSAIDs; may use cough drops or lozenges PRN; also OTC/prescription PPI or H2RA PO qday or BID with anti-GERD measures. 5. NO ASA/NSAIDs x 2 weeks  6. OP f/u in 4-6 weeks with Ms. Jillian Alonso; will consider an Esophageal manometry later if the patient's dysphagia persists. 7.  Schedule colonoscopy with Plenvu prep assisted by Reglan 10 mg p.o. every 4 hours as needed for any prep associated nausea or vomiting  8. In addition to current medications aimed at therapy of GERD, patient will likely benefit from cognitive behavioral therapy    NSAIDS Non-steroidal Anti-Inflammatory  You have been directed by your physician to avoid any NSAID's; the following medications are a list of those to avoid. If you think that you are taking any NSAID's notify your physician.   Over The Counter  Advil                      Motrin  Nuprin                   Ibuprofen  Midol                     Aleve  Naproxen              Orudis  Aspirin                   Ifrah[place]  Prescriptions and Generics  Cataflam              Relafen  Voltaren Clinoril  Indocin                 Naproxen  Arthrotec              Lodine  Daypro                 Nalfon  Toradol                Ansaid  Feldene               Meclofenamate  Fenoprofen          Ponstel  Mobic                   Celebrex  Vioxx

## 2023-03-14 NOTE — OP NOTE
Endoscopic Procedure Note    Patient: Brandy Hylton : 1970  Med Rec#: [id number] Acc#: [id number]     Primary Care Provider Cookie Maxwell MD    Endoscopist: Nima Evans MD, MD    Date of Procedure:  3/14/2023    Procedure:   1. EGD with a Parra bougie dilation of esophagus and cold biopsies    Indications:   1. Dysphagia, unspecified type  2. Epigastric pain   3.  dark stools almost black    Anesthesia:  Sedation was administered by anesthesia who monitored the patient during the procedure. Estimated Blood Loss: minimal    Procedure:   After reviewing the patient's chart and obtaining informed consent, the patient was placed in the left lateral decubitus position. A forward-viewing Olympus endoscope was lubricated and inserted through the mouth into the oropharynx. Under direct visualization, the upper esophagus was intubated. The scope was advanced to the level of the third portion of duodenum. Scope was slowly withdrawn with careful inspection of the mucosal surfaces. The scope was retroflexed for inspection of the gastric fundus and incisura. Findings and maneuvers are listed in impression below. Next, a lubricated Parra weighted Bougie dilator-54 Fr was gently introduced into the patient's mouth and passed into the Esophagus and into the proximal stomach without much resistance and then withdrawn. Repeat EGD was performed to verify dilation and scope tip was passed into the stomach. NO evidence of perforation or excessive bleeding was noted subsequent to the dilation. The patient tolerated the procedure well. The scope was removed. There were no immediate complications. Findings/IMPRESSION:  Esophagus: normal and EG junction at 42 cm also appeared normal  There is no obvious hiatal hernia present. NO erosions or ulcers or nodules or strictures or webs or rings or mass lesions or extrinsic compression or diverticula noted.  An empirical Parra 54 fr bougie dilation was performed and random cold biopsies were taken to check for EoE and NERD. Stomach:  normal.    NO ulcers or masses or gastric outlet obstruction or retained food or fluid. Rugae were normal and lumen distended well with insufflation. Retroflexed views otherwise revealed a normal GE junction, fundus and cardia as well. Duodenum: Normal. Random biopsies were taken to check for Celiac disease and other causes of villous atrophy. RECOMMENDATIONS:    1. Await path results, the patient will be contacted in 7-10 days with biopsy results. 2.  Magic mouthwash 5 ml PO Swish and swallow q3h PRN ONLY IF patient has post-procedural sorethroat or chest pain. 3. Full liquids to soft diet today huang discharge from the surgicenter; may advance  diet starting in AM tomorrow. 4. May resume other meds except any ASA/NSAIDs; may use cough drops or lozenges PRN; also OTC/prescription PPI or H2RA PO qday or BID with anti-GERD measures. 5. NO ASA/NSAIDs x 2 weeks  6. OP f/u in 4-6 weeks with Ms. Reilly Burden; will consider an Esophageal manometry later if the patient's dysphagia persists. 7.  Schedule colonoscopy with Plenvu prep assisted by Reglan 10 mg p.o. every 4 hours as needed for any prep associated nausea or vomiting  8. In addition to current medications aimed at therapy of GERD, patient will likely benefit from cognitive behavioral therapy    The results were discussed with the patient and family. A copy of the images obtained were given to the patient.      (Please note that portions of this note were completed with a voice recognition program. Efforts were made to edit the dictations but occasionally words may be mis-transcribed.)     Jazz Andrade MD, MD  3/14/2023  9:56 AM

## 2023-03-14 NOTE — H&P
Patient Name: Florecnio Esqueda  : 1970  MRN: [id number]  DATE: 23    Allergies:   Allergies   Allergen Reactions    Penicillins Rash     As a child        ENDOSCOPY  History and Physical    Procedure:    [] Diagnostic Colonoscopy       [] Screening Colonoscopy  [x] EGD      [] ERCP      [] EUS       [] Other    [x] Previous office notes/History and Physical reviewed from the patients chart. Please see EMR for further details of HPI. I have examined the patient's status immediately prior to the procedure and:      Indications/HPI:      1. Dysphagia, unspecified type  2. Epigastric pain   3.  dark stools almost black    []Abdominal Pain   []Cancer- GI/Lung     []Fhx of colon CA/polyps  []History of Polyps  []Barretts            []Melena  []Abnormal Imaging              []Dysphagia              []Persistent Pneumonia   []Anemia                            []Food Impaction        []History of Polyps  [] GI Bleed             []Pulmonary nodule/Mass   []Change in bowel habits []Heartburn/Reflux  []Rectal Bleed (BRBPR)  []Chest Pain - Non Cardiac []Heme (+) Stool []Ulcers  []Constipation  []Hemoptysis  []Varices  []Diarrhea  []Hypoxemia    []Nausea/Vomiting   []Screening   []Crohns/Colitis  []Other:     Anesthesia:   [x] MAC [] Moderate Sedation   [] General   [] None     ROS: 12 pt Review of Symptoms was negative unless mentioned above    Medications:   Prior to Admission medications    Medication Sig Start Date End Date Taking? Authorizing Provider   Alum & Mag Hydroxide-Simeth (MYLANTA PO) Take by mouth daily With every meal    Historical Provider, MD   omeprazole (PRILOSEC) 40 MG delayed release capsule Take 1 capsule by mouth daily Take first thing daily on an empty stomach. 23   CAMILA Albright   bismuth subsalicylate (PEPTO BISMOL) 262 MG/15ML suspension Take 15 mLs by mouth every 6 hours as needed for Indigestion    Historical Provider, MD       Past Medical History:  No past medical  history on file. Past Surgical History:  No past surgical history on file. Social History:  Social History     Tobacco Use    Smoking status: Former     Types: Cigarettes     Quit date: 2022     Years since quittin.8    Smokeless tobacco: Never   Vaping Use    Vaping Use: Former   Substance Use Topics    Alcohol use: Not Currently     Comment: not since 26 yrs of age    Drug use: Never       Vital Signs:   Vitals:    23 0845   BP: 129/69   Pulse: 96   Resp: 16   Temp: 97 °F (36.1 °C)   SpO2: 98%        Physical Exam:  Cardiac:  [x]WNL  []Comments:  Pulmonary:  [x]WNL   []Comments:  Neuro/Mental Status:  [x]WNL  []Comments:  Abdominal:  [x]WNL    []Comments:  Other:   []WNL  []Comments:    Informed Consent:  The risks and benefits of the procedure have been discussed with either the patient or if they cannot consent, their representative. Assessment:  Patient examined and appropriate for planned sedation and procedure. Plan:  Proceed with planned sedation and procedure as above.          Nima Evans MD

## 2023-03-14 NOTE — ANESTHESIA POSTPROCEDURE EVALUATION
Department of Anesthesiology  Postprocedure Note    Patient: Adi Weston  MRN: [id number]  YOB: 1970  Date of evaluation: 3/14/2023      Procedure Summary     Date: 03/14/23 Room / Location: [facility] ENDO 02 / [address]    Anesthesia Start: 7317 Anesthesia Stop: 1009    Procedures:       EGD BIOPSY (Esophagus)      EGD DILATION GREEN 54 FR Diagnosis:       Dysphagia, unspecified type      Epigastric pain      (Dysphagia, unspecified type [R13.10])      (Epigastric pain [R10.13])    Surgeons: Joana Huerta MD Responsible Provider: Lavella Halsted, APRN - CRNA    Anesthesia Type: General ASA Status: 2          Anesthesia Type: General    Yolanda Phase I:      Yolanda Phase II:        Anesthesia Post Evaluation    Patient location during evaluation: bedside  Patient participation: complete - patient participated  Level of consciousness: awake  Pain score: 0  Airway patency: patent  Nausea & Vomiting: no nausea and no vomiting  Complications: no  Cardiovascular status: hemodynamically stable  Respiratory status: acceptable, room air and spontaneous ventilation  Hydration status: euvolemic

## 2023-03-14 NOTE — TELEPHONE ENCOUNTER
03-14-23 Per Dr Danita Aldridge,     Op note      Schedule colonoscopy with Plenvu prep assisted by Reglan 10 mg p.o. every 4 hours as needed for any prep associated nausea or vomiting      Routed to [facility]

## 2023-03-31 ENCOUNTER — TELEPHONE (OUTPATIENT)
Dept: ENT CLINIC | Age: 53
End: 2023-03-31

## 2023-04-18 ENCOUNTER — OFFICE VISIT (OUTPATIENT)
Dept: GASTROENTEROLOGY | Age: 53
End: 2023-04-18

## 2023-04-18 VITALS
HEART RATE: 94 BPM | WEIGHT: 263 LBS | SYSTOLIC BLOOD PRESSURE: 160 MMHG | HEIGHT: 74 IN | BODY MASS INDEX: 33.75 KG/M2 | DIASTOLIC BLOOD PRESSURE: 60 MMHG | OXYGEN SATURATION: 99 %

## 2023-04-18 DIAGNOSIS — R13.10 DYSPHAGIA, UNSPECIFIED TYPE: ICD-10-CM

## 2023-04-18 DIAGNOSIS — Z12.11 COLON CANCER SCREENING: Primary | ICD-10-CM

## 2023-04-18 ASSESSMENT — ENCOUNTER SYMPTOMS
ABDOMINAL DISTENTION: 0
ABDOMINAL PAIN: 0
RECTAL PAIN: 0
VOMITING: 0
DIARRHEA: 0
CONSTIPATION: 0
ANAL BLEEDING: 0
COUGH: 0
TROUBLE SWALLOWING: 1
NAUSEA: 0
SHORTNESS OF BREATH: 0
CHOKING: 0
BLOOD IN STOOL: 0

## 2023-04-18 NOTE — PROGRESS NOTES
Subjective:     Patient ID: Oliva Panda is a 48 y.o. male  PCP: Ina Hewitt MD  Referring Provider: No ref. provider found    HPI  Patient presents to the office today with the following complaints: Follow-up      Patient reports his swallowing is some better, but he still has trouble with certain foods especially meat feels like it gets stuck. We discussed further motility studies. Patient declines to reschedule his colonoscopy states he isn't going to do that. We discussed the need for colonoscopy and he still declines, but he does agree to cologuard testing    Assessment:     1. Colon cancer screening  -     Fecal DNA Colorectal cancer screening (Cologuard)  2. Dysphagia, unspecified type  -     External Referral To Gastroenterology         Plan: We will schedule esophageal manometry   Cologuard   Orders  Orders Placed This Encounter   Procedures    Fecal DNA Colorectal cancer screening (Cologuard)    External Referral To Gastroenterology     Referral Priority:   Routine     Referral Type:   Eval and Treat     Referral Reason:   Specialty Services Required     Requested Specialty:   Gastroenterology     Number of Visits Requested:   1     Medications  No orders of the defined types were placed in this encounter. Patient History:     No past medical history on file.     Past Surgical History:   Procedure Laterality Date    UPPER GASTROINTESTINAL ENDOSCOPY N/A 03/14/2023    Dr Delilah Galindo, W 47 fr dil, (-)Sprue, (+)GERD,    UPPER GASTROINTESTINAL ENDOSCOPY  03/14/2023    Dr Gaby Henson 54 fr bougie dilations       Family History   Problem Relation Age of Onset    Cancer Mother 39        brain    Lung Cancer Mother     Cancer Brother 39    Lung Cancer Brother     Colon Cancer Neg Hx     Colon Polyps Neg Hx        Social History     Socioeconomic History    Marital status:    Tobacco Use    Smoking status: Former     Types: Cigarettes     Quit date: 5/1/2022     Years since

## 2023-04-19 ENCOUNTER — TELEPHONE (OUTPATIENT)
Dept: GASTROENTEROLOGY | Age: 53
End: 2023-04-19

## 2023-04-19 NOTE — TELEPHONE ENCOUNTER
----- Message from JULIA SIMMS [facility] sent at 4/18/2023  1:38 PM CDT -----  Regarding: Checkout note 4/18/23  Esophageal manometry at [facility]

## 2023-04-19 NOTE — TELEPHONE ENCOUNTER
4-19-23  Referral records faxed to [facility],,,,[phone]    Fax confirmation scanned in media and attached to this encounter.

## 2023-05-23 ENCOUNTER — HOSPITAL ENCOUNTER (EMERGENCY)
Age: 53
Discharge: HOME | End: 2023-05-23
Attending: EMERGENCY MEDICINE

## 2023-05-23 VITALS
TEMPERATURE: 98.9 F | OXYGEN SATURATION: 98 % | SYSTOLIC BLOOD PRESSURE: 164 MMHG | HEART RATE: 100 BPM | RESPIRATION RATE: 18 BRPM | DIASTOLIC BLOOD PRESSURE: 68 MMHG

## 2023-05-23 DIAGNOSIS — J02.9 ACUTE PHARYNGITIS, UNSPECIFIED ETIOLOGY: Primary | ICD-10-CM

## 2023-05-23 LAB
FLUAV RNA RESP QL NAA+PROBE: NOT DETECTED
FLUBV RNA RESP QL NAA+PROBE: NOT DETECTED
RSV AG NPH QL IA.RAPID: NOT DETECTED
S PYO DNA THROAT QL NAA+PROBE: NOT DETECTED
SARS-COV-2 RNA RESP QL NAA+PROBE: NOT DETECTED
SERVICE CMNT-IMP: NORMAL
SERVICE CMNT-IMP: NORMAL

## 2023-05-23 PROCEDURE — C9803 HOPD COVID-19 SPEC COLLECT: HCPCS

## 2023-05-23 PROCEDURE — 99282 EMERGENCY DEPT VISIT SF MDM: CPT

## 2023-05-23 PROCEDURE — 0241U COVID/FLU/RSV PANEL: CPT | Performed by: EMERGENCY MEDICINE

## 2023-05-23 PROCEDURE — 10002803 HB RX 637: Performed by: EMERGENCY MEDICINE

## 2023-05-23 PROCEDURE — 87651 STREP A DNA AMP PROBE: CPT | Performed by: EMERGENCY MEDICINE

## 2023-05-23 RX ORDER — DEXAMETHASONE 2 MG/1
10 TABLET ORAL
Qty: 5 TABLET | Refills: 0 | Status: SHIPPED | OUTPATIENT
Start: 2023-05-23

## 2023-05-23 RX ORDER — BENZONATATE 200 MG/1
200 CAPSULE ORAL 3 TIMES DAILY PRN
Qty: 20 CAPSULE | Refills: 0 | Status: SHIPPED | OUTPATIENT
Start: 2023-05-23 | End: 2023-05-30

## 2023-05-23 RX ORDER — CODEINE PHOSPHATE AND GUAIFENESIN 10; 100 MG/5ML; MG/5ML
5 SOLUTION ORAL ONCE
Status: COMPLETED | OUTPATIENT
Start: 2023-05-23 | End: 2023-05-23

## 2023-05-23 RX ADMIN — GUAIFENESIN AND CODEINE PHOSPHATE 5 ML: 10; 100 LIQUID ORAL at 06:30

## 2023-05-23 ASSESSMENT — PAIN SCALES - GENERAL: PAINLEVEL_OUTOF10: 9

## 2024-11-12 ENCOUNTER — TRANSCRIBE ORDERS (OUTPATIENT)
Dept: ADMINISTRATIVE | Facility: HOSPITAL | Age: 54
End: 2024-11-12
Payer: MEDICAID

## 2024-11-12 DIAGNOSIS — R01.0 BENIGN AND INNOCENT CARDIAC MURMURS: Primary | ICD-10-CM

## 2025-01-03 ENCOUNTER — HOSPITAL ENCOUNTER (OUTPATIENT)
Dept: CARDIOLOGY | Facility: HOSPITAL | Age: 55
Discharge: HOME | End: 2025-01-03
Payer: MEDICAID

## 2025-01-03 DIAGNOSIS — R01.0 BENIGN AND INNOCENT CARDIAC MURMURS: ICD-10-CM

## 2025-01-03 PROCEDURE — 93306 TTE W/DOPPLER COMPLETE: CPT

## 2025-01-03 PROCEDURE — 93356 MYOCRD STRAIN IMG SPCKL TRCK: CPT

## 2025-01-06 LAB
AV MEAN PRESS GRAD SYS DOP V1V2: 7.7 MMHG
AV VMAX SYS DOP: 183.4 CM/SEC
BH CV ECHO LEFT VENTRICLE GLOBAL LONGITUDINAL STRAIN: -14 %
BH CV ECHO MEAS - AI P1/2T: 277.2 MSEC
BH CV ECHO MEAS - AO MAX PG: 13.6 MMHG
BH CV ECHO MEAS - AO ROOT DIAM: 4.2 CM
BH CV ECHO MEAS - AO V2 VTI: 38.8 CM
BH CV ECHO MEAS - AVA(I,D): 3.9 CM2
BH CV ECHO MEAS - EDV(CUBED): 288.3 ML
BH CV ECHO MEAS - ESV(CUBED): 133.4 ML
BH CV ECHO MEAS - FS: 22.7 %
BH CV ECHO MEAS - IVS/LVPW: 1.13 CM
BH CV ECHO MEAS - IVSD: 1.19 CM
BH CV ECHO MEAS - LA DIMENSION: 3.9 CM
BH CV ECHO MEAS - LAT PEAK E' VEL: 8 CM/SEC
BH CV ECHO MEAS - LV MASS(C)D: 336.3 GRAMS
BH CV ECHO MEAS - LV MAX PG: 5 MMHG
BH CV ECHO MEAS - LV MEAN PG: 3.1 MMHG
BH CV ECHO MEAS - LV V1 MAX: 111.4 CM/SEC
BH CV ECHO MEAS - LV V1 VTI: 21.6 CM
BH CV ECHO MEAS - LVIDD: 6.6 CM
BH CV ECHO MEAS - LVIDS: 5.1 CM
BH CV ECHO MEAS - LVOT AREA: 6.9 CM2
BH CV ECHO MEAS - LVOT DIAM: 3 CM
BH CV ECHO MEAS - LVPWD: 1.05 CM
BH CV ECHO MEAS - MED PEAK E' VEL: 9 CM/SEC
BH CV ECHO MEAS - MV A MAX VEL: 61.5 CM/SEC
BH CV ECHO MEAS - MV DEC SLOPE: 236.2 CM/SEC2
BH CV ECHO MEAS - MV DEC TIME: 0.21 SEC
BH CV ECHO MEAS - MV E MAX VEL: 48.9 CM/SEC
BH CV ECHO MEAS - MV E/A: 0.79
BH CV ECHO MEAS - MV MAX PG: 4.8 MMHG
BH CV ECHO MEAS - MV MEAN PG: 2.11 MMHG
BH CV ECHO MEAS - MV V2 VTI: 31.5 CM
BH CV ECHO MEAS - MVA(VTI): 4.7 CM2
BH CV ECHO MEAS - PA V2 MAX: 79.4 CM/SEC
BH CV ECHO MEAS - RAP SYSTOLE: 8 MMHG
BH CV ECHO MEAS - RV MAX PG: 1.67 MMHG
BH CV ECHO MEAS - RV V1 MAX: 64.6 CM/SEC
BH CV ECHO MEAS - RV V1 VTI: 12.8 CM
BH CV ECHO MEAS - RVDD: 3.6 CM
BH CV ECHO MEAS - SV(LVOT): 149.7 ML
BH CV ECHO MEAS - SVI(LVOT): 57.3 ML/M2
BH CV ECHO MEAS - TAPSE (>1.6): 2.5 CM
BH CV ECHO MEASUREMENTS AVERAGE E/E' RATIO: 5.75
BH CV XLRA - RV BASE: 3.9 CM
BH CV XLRA - RV LENGTH: 9.1 CM
BH CV XLRA - RV MID: 2.8 CM
BH CV XLRA - TDI S': 20 CM/SEC
LEFT ATRIUM VOLUME INDEX: 33 ML/M2
LEFT ATRIUM VOLUME: 86 ML

## 2025-01-20 ENCOUNTER — PATIENT ROUNDING (BHMG ONLY) (OUTPATIENT)
Dept: OTOLARYNGOLOGY | Facility: CLINIC | Age: 55
End: 2025-01-20
Payer: MEDICAID

## 2025-01-20 ENCOUNTER — PROCEDURE VISIT (OUTPATIENT)
Dept: OTOLARYNGOLOGY | Facility: CLINIC | Age: 55
End: 2025-01-20
Payer: MEDICAID

## 2025-01-20 ENCOUNTER — OFFICE VISIT (OUTPATIENT)
Dept: OTOLARYNGOLOGY | Facility: CLINIC | Age: 55
End: 2025-01-20
Payer: MEDICAID

## 2025-01-20 VITALS
DIASTOLIC BLOOD PRESSURE: 77 MMHG | HEIGHT: 73 IN | TEMPERATURE: 98.6 F | SYSTOLIC BLOOD PRESSURE: 158 MMHG | WEIGHT: 300 LBS | HEART RATE: 87 BPM | BODY MASS INDEX: 39.76 KG/M2

## 2025-01-20 DIAGNOSIS — H90.42 SENSORINEURAL HEARING LOSS (SNHL) OF LEFT EAR WITH UNRESTRICTED HEARING OF RIGHT EAR: Primary | ICD-10-CM

## 2025-01-20 DIAGNOSIS — H91.8X3 ASYMMETRICAL HEARING LOSS: Primary | ICD-10-CM

## 2025-01-20 DIAGNOSIS — H93.8X2 PRESSURE SENSATION IN LEFT EAR: ICD-10-CM

## 2025-01-20 DIAGNOSIS — H90.42 SENSORINEURAL HEARING LOSS (SNHL) OF LEFT EAR WITH UNRESTRICTED HEARING OF RIGHT EAR: ICD-10-CM

## 2025-01-20 DIAGNOSIS — H93.13 TINNITUS, BILATERAL: ICD-10-CM

## 2025-01-20 DIAGNOSIS — H69.92 ETD (EUSTACHIAN TUBE DYSFUNCTION), LEFT: ICD-10-CM

## 2025-01-20 PROCEDURE — 92557 COMPREHENSIVE HEARING TEST: CPT

## 2025-01-20 PROCEDURE — 99203 OFFICE O/P NEW LOW 30 MIN: CPT | Performed by: OTOLARYNGOLOGY

## 2025-01-20 PROCEDURE — 1159F MED LIST DOCD IN RCRD: CPT | Performed by: OTOLARYNGOLOGY

## 2025-01-20 PROCEDURE — 92567 TYMPANOMETRY: CPT

## 2025-01-20 PROCEDURE — 1160F RVW MEDS BY RX/DR IN RCRD: CPT | Performed by: OTOLARYNGOLOGY

## 2025-01-20 RX ORDER — FLUTICASONE PROPIONATE 50 MCG
2 SPRAY, SUSPENSION (ML) NASAL 2 TIMES DAILY
Qty: 48 G | Refills: 3 | Status: SHIPPED | OUTPATIENT
Start: 2025-01-20

## 2025-01-20 RX ORDER — OMEPRAZOLE 40 MG/1
40 CAPSULE, DELAYED RELEASE ORAL DAILY
COMMUNITY
Start: 2024-11-12

## 2025-01-20 RX ORDER — LOSARTAN POTASSIUM 100 MG/1
100 TABLET ORAL DAILY
COMMUNITY
Start: 2024-11-12

## 2025-01-20 RX ORDER — AMLODIPINE BESYLATE 5 MG/1
5 TABLET ORAL DAILY
COMMUNITY
Start: 2024-11-12

## 2025-01-20 NOTE — PROGRESS NOTES
AUDIOMETRIC EVALUATION      Name:  Keith Rojas  :  1970  Age:  54 y.o.  Date of Evaluation:  2025       History:  Mr. Rojas is seen today for a hearing evaluation due to hearing loss of left ear at the request of Roland Hassan Jr., MD.    Audiologic Information:  Concerns for Hearing: Left ear has been bad for about 10 years  PETs: No  Other otologic surgical history: No  Aural Pressure/Fullness: No  Otalgia: No  Otorrhea: No  Tinnitus: Intermittent bilateral ringing  Dizziness: Not since last August  Noise Exposure: [employer] [place] with hearing protection  Family history of hearing loss: No  Head trauma requiring hospital stay: No  Chemotherapy: No  Other significant history: hypertension      **Case history obtained in office and through EMR system      EVALUATION:        RESULTS:    Otoscopic Evaluation:  Right: clear canal, tympanic membrane visualized  Left: clear canal, tympanic membrane visualized    Tympanometry (226 Hz):  Right: Type A  Left: Type A    Pure Tone Audiometry:    Right: Normal hearing thresholds   Left: Normal (250Hz-1000Hz) sloping to moderately severe sensorineural hearing loss (15dB single ABG at 4000Hz)    Speech Audiometry:   Right: Speech Reception Threshold (SRT) was obtained at 10 dB HL  Word Recognition scores - Excellent (100)% at 50 dB, using NU-6 List 1A with 10 words  Left: Speech Reception Threshold (SRT) was obtained at 25 dB HL  Word Recognition scores - Excellent (100)% at 65 dB, using NU-6 List 1A with 10 words  SRT/PTA in good agreement bilaterally.        IMPRESSIONS:  Tympanometry showed normal middle ear pressure and static compliance, consistent with normal middle ear function for both ears. Pure tone thresholds for the right ear show normal hearing, suggesting normal outer/middle ear function and normal cochlear/retrocochlear function. Pure tone thresholds for the left ear show sensorineural hearing loss, suggesting normal outer/middle ear function and  abnormal cochlear/retrocochlear function. Patient was counseled with regard to the findings.    Amplification needs:  Patient could benefit from hearing aids.    Diagnosis:  1. Sensorineural hearing loss (SNHL) of left ear with unrestricted hearing of right ear    2. Tinnitus, bilateral         RECOMMENDATIONS/PLAN:  Follow-up recommendations per Roland Hassan Jr., MD.  Practice good communication strategies to assist with everyday listening (eye contact with speakers, reduce background noise, encourage others to communicate clearly and slowly).  Consistent utilization of hearing protection devices in noisy environments.  Hearing aid evaluation and counseling upon medical clearance and patient motivation.   Repeat hearing evaluation if changes in hearing are noted or concerns arise.  Discussed results and recommendations with patient. Questions were addressed and the patient was encouraged to contact our department should concerns arise.        Adrianne Alicea, CCC-A, F-AAA  Doctor of Audiology

## 2025-01-20 NOTE — PROGRESS NOTES
January 20, 2025    Hello, may I speak with Keith Rojas?    My name is MARLENI        I am  with [facility] [place] [facility]  [address]  [place] [address] [phone].    Before we get started may I verify your date of birth? 1970    I am calling to officially welcome you to our practice and ask about your recent visit. Is this a good time to talk? yes    Tell me about your visit with us. What things went well?  it was a good visit       We're always looking for ways to make our patients' experiences even better. Do you have recommendations on ways we may improve?  no    Overall were you satisfied with your first visit to our practice? yes       I appreciate you taking the time to speak with me today. Is there anything else I can do for you? no      Thank you, and have a great day.

## 2025-01-20 NOTE — PROGRESS NOTES
Roland Hassan Jr, MD  [facility]  [address], [address]  [place] [address]  Fax [phone]  Phone [phone]      Visit Type: NEW PATIENT   Chief Complaint   Patient presents with    Hearing Loss     Unspecified hearing loss, left ear           HISTORY OBTAINED FROM: patient  Accompanied by:No one  HPI  He complains of L ear decreased hearing.   Began 10 yrs ago. Was in water and then lost hearing for 3 months. Then opened up.  He has noted with elevation that Left hearing will return. He will get hearing back for 30 sec. He says this has happened recently. Most days hearing down.  Dizziness- Has had some in summer. Sat up in bed, rolling sensation. Lasted 2 days, then went to ER. Not close to this ER.  Ringing- Alternate, intermittent, high pitched. Comes and goes.  Gunfire- some  Hearing protection- used in [place].  Smoke- none  Drink- quit years ago  No prior audio.  Had [employer] years ago.  He feels something stuck in Left ear.  No ANNIE. Does snore    History of Present Illness      Past Medical History:   Diagnosis Date    GERD (gastroesophageal reflux disease) 01/01/2021    HL (hearing loss) 01/01/2020    Tinnitus 01/01/2000       History reviewed. No pertinent surgical history.    Family History: His family history is not on file.     Social History: He  reports that he has quit smoking. His smoking use included cigarettes. He has a 15 pack-year smoking history. He does not have any smokeless tobacco history on file. He reports that he does not currently use alcohol. He reports current drug use. Frequency: 7.00 times per week. Drug: Marijuana.    Home Medications:  amLODIPine, losartan, and omeprazole    Allergies:  He is allergic to penicillins.       Vital Signs:   Temp:  [98.6 °F (37 °C)] 98.6 °F (37 °C)  Heart Rate:  [87] 87  BP: (158)/(77) 158/77  ENT Physical Exam  Constitutional  Appearance: patient  appears well-developed, patient is cooperative; Appearance comments: overweight  Communication/Voice: communication appropriate for developmental age; vocal quality normal;  Head and Face  Appearance: head appears normal, face appears normal and face appears atraumatic;  Palpation: facial palpation normal;  Salivary: glands normal;  Ear  Hearing: impaired to conversational voice (Left side);  Auricles: bilateral auricles normal;  External Mastoids: right external mastoid normal; left external mastoid normal;  Ear Canals: bilateral ear canals normal; Ear Canal comments: Too clean  Tympanic Membranes: bilateral tympanic membranes normal;  Nose  External Nose: nares patent bilaterally; external nose normal;  Internal Nose: nasal mucosa normal; nasal septal deviation present; deviation is to the right, septal deviation is intermediate; Septum comments: L to R low and mid moderate   bilateral inferior turbinates normal;  Oral Cavity/Oropharynx  Lips: normal;  Teeth: normal;  Gums: gingiva normal;  Tongue: normal;  Oral mucosa: normal;  Hard palate: normal;  Soft palate: normal;  Tonsils: normal;  Base of Tongue: normal;  Posterior pharyngeal wall: normal;  Neck  Neck: neck normal;  Respiratory  Inspection: breathing unlabored; normal breathing rate;  Cardiovascular  Inspection: extremities are warm and well perfused; no peripheral edema present;  Neurovestibular  Mental Status: alert and oriented;  Psychiatric: mood normal; affect is appropriate;           Result Review       RESULTS REVIEW    I have reviewed the patients old records in the chart.   I have reviewed the patients old records in the chart.  The following results/records were reviewed:  Audiologic testing reviewed.  Mild to moderate sloping of left nerve line with good discrimination scores bilateral    Referral for Unspecified hearing loss, left ear (11/16/2024)   PROGRESS NOTES - SCAN - PN-ALICE GLYNN MD-11.11.2024 (11/11/2024)    REFERRAL/PRE-AUTH MRN -  SCAN - REFERRAL-ALICE GLYNN MD-11.12.2024 (11/12/2024)    Procedure visit with Adrianne Boothe AUD (01/20/2025)          Assessment & Plan  Asymmetrical hearing loss  Left-sided  Sensorineural hearing loss (SNHL) of left ear with unrestricted hearing of right ear  Mild to moderate sloping  Pressure sensation in left ear  Probably related to eustachian tube dysfunction  ETD (Eustachian tube dysfunction), left                Assessment & Plan      Conservative management.  Patient appears to have asymmetric hearing loss.  This has been going on for over 10 years.  I do not feel MRI would be beneficial at this time.  I recommended the patient seek hearing aid evaluation on the left ear.  He does not wish to seek hearing aids at this point in time.  I will place him on nasal medications to see if this helps his left ear fullness.  I see no fluid at this point in time.  Flonase twice daily  Nasal saline  Hearing aid referral  Pop ears  Call for problems    My Chart:  Patient is using My Chart    Patient understand(s) and agree(s) with the treatment plan as described.      Return in about 3 months (around 4/20/2025) for Recheck Ears and ear pressure.        Electronically signed by Roland Hassan Jr, MD 01/20/25 2:55 PM CST.

## 2025-01-20 NOTE — PATIENT INSTRUCTIONS
>NASAL SALINE:  Use 2 puffs each nostril 4-6 times daily and more frequently if possible.  You can buy saline spray or you can make your own and use an old spray bottle to administer  Use a humidifier at bedside  Recipe for saline:  Water                                 1 quart  Salt (table)                        1 tablespoon  Gylcerin (or Jaclyn Syrup)    1 teaspoon  Sodium bicarbonate           1 teaspoon  Sprays or [place] pots are recommended    Do not allow to stand for more than 24 hrs. Make new solution. There is no preservative in this solution.    Sinus irrigation and saline application can be enhanced with various over-the-counter products.  A WaterPik can be quite useful to irrigate, especially following sinus surgery.  Navage makes a product that irrigates the nose and some of the sinuses.  NeilMed makes a Sinu-Gator to irrigate the sinuses.  Neomed also has canned saline that we will come out under pressure.  A Sheba pot can also be helpful.  All of these products help keep the nose clear of debris.  Please use as directed on the instructions that come with the particular device.      >NASAL STEROID use:  Using nasal steroids:  You will be prescribed one of the following nasal steroids: Flonase, Nasacort, Nasonex, Rhinocort, Qnasl, Zetonna  2 puffs each nostril 2 times daily  Start as soon as possible  If you are using Afrin for 3 days with the nasal steroid,  Use Afrin first and wait 10 minutes to allow the nose to open. Then administer nasal steroids.     Hearing aid referral    EAR CARE: :using oil  Use a hair dryer on low heat and blow into the ear canals 2 times daily to keep ears dry.  DO Not use Q tips or Vaishali pins, ever!!  Do not use alcohol in the ear canal (this will cause dryness and itching)  NO peroxide or alcohol in ears  Oil: Use Sweet oil, Olive oil, or Mineral oil, purchased over the counter, once a week, Do not use Q tips, You may use a hair dryer on low heat. Blow in ears for 10-15  seconds 2 times daily to dry ear canals or if ear canals are itching.    Call for ear problems       CONTACT INFORMATION:  The main office phone number is [phone]. For emergencies after hours and on weekends, this number will convert over to our answering service and the on call provider will answer. Please try to keep non emergent phone calls/ questions to office hours 9am-5pm Monday through Friday.     Conservis  As an alternative, you can sign up and use the Epic MyChart system for more direct and quicker access for non emergent questions/ problems.  [facility] Conservis allows you to send messages to your doctor, view your test results, renew your prescriptions, schedule appointments, and more. To sign up, go to [employer] and click on the Sign Up Now link in the New User? box. Enter your Conservis Activation Code exactly as it appears below along with the last four digits of your Social Security Number and your Date of Birth () to complete the sign-up process. If you do not sign up before the expiration date, you must request a new code.    Conservis Activation Code: Activation code not generated  Current Conservis Status: Active    If you have questions, you can email [email or web address] or call [phone] to talk to our Conservis staff. Remember, Conservis is NOT to be used for urgent needs. For medical emergencies, dial 911.

## 2025-02-24 ENCOUNTER — OFFICE VISIT (OUTPATIENT)
Dept: CARDIOLOGY | Facility: CLINIC | Age: 55
End: 2025-02-24
Payer: MEDICAID

## 2025-02-24 VITALS
SYSTOLIC BLOOD PRESSURE: 132 MMHG | OXYGEN SATURATION: 98 % | WEIGHT: 298 LBS | BODY MASS INDEX: 39.49 KG/M2 | HEIGHT: 73 IN | DIASTOLIC BLOOD PRESSURE: 68 MMHG | HEART RATE: 108 BPM

## 2025-02-24 DIAGNOSIS — I35.1 NONRHEUMATIC AORTIC VALVE INSUFFICIENCY: Primary | ICD-10-CM

## 2025-02-24 DIAGNOSIS — I51.7 LVE (LEFT VENTRICULAR ENLARGEMENT): ICD-10-CM

## 2025-02-24 DIAGNOSIS — I77.810 AORTIC ROOT DILATATION: ICD-10-CM

## 2025-02-24 DIAGNOSIS — I10 PRIMARY HYPERTENSION: ICD-10-CM

## 2025-02-24 PROCEDURE — 99204 OFFICE O/P NEW MOD 45 MIN: CPT | Performed by: INTERNAL MEDICINE

## 2025-02-24 PROCEDURE — 3075F SYST BP GE 130 - 139MM HG: CPT | Performed by: INTERNAL MEDICINE

## 2025-02-24 PROCEDURE — 93000 ELECTROCARDIOGRAM COMPLETE: CPT | Performed by: INTERNAL MEDICINE

## 2025-02-24 PROCEDURE — 1159F MED LIST DOCD IN RCRD: CPT | Performed by: INTERNAL MEDICINE

## 2025-02-24 PROCEDURE — 1160F RVW MEDS BY RX/DR IN RCRD: CPT | Performed by: INTERNAL MEDICINE

## 2025-02-24 PROCEDURE — 3078F DIAST BP <80 MM HG: CPT | Performed by: INTERNAL MEDICINE

## 2025-02-24 NOTE — PROGRESS NOTES
[facility] - CARDIOLOGY  New Patient Initial Outpatient Evaluation    Primary Care Physician: Maximo Duffy Jr., MD    Subjective     Chief Complaint: valvular heart disease    History of Present Illness  The patient is a 54-year-old male on 2 antihypertensive medications for hypertension who was referred for evaluation by Dr. Maximo Duffy after an echocardiogram performed on 01/03/2025 noted a low normal ejection fraction at 51 to 55 percent, a moderately dilated left ventricle, and moderate to severe aortic regurgitation. According to Dr. Duffy's note from the visit on 01/17/2025, he had been out of town in [place] a few months prior and developed pulmonary edema requiring evaluation in an ER there and being given Lasix twice daily, which helped at that time. At the time of the follow-up visit with Dr. Duffy on 01/17/2025, he was not experiencing any shortness of breath or chest pain.    He reports that Dr. Duffy detected a musical murmur during his examination. Approximately 6 months ago, he sought emergency care in [place] due to leg swelling, which was initially attributed to acute altitude sickness. He experienced difficulty breathing and lightheadedness, necessitating him to sleep in a vertical position. His blood pressure was elevated at 180 systolic during this episode. Despite these challenges, he managed to overcome them through meditation and deep breathing exercises. He has a history of high blood pressure and has been on medication for this condition. He has been informed of a heart murmur since childhood. He acknowledges being overweight by approximately 100 pounds and leading a sedentary lifestyle. He expresses a desire to lose weight and increase his physical activity. He was administered intravenous Lasix in the ER, which significantly improved his condition. He was discharged with a prescription for furosemide, which he took twice daily for a week. However, Dr. Duffy discontinued this medication  in 2025, and he has not experienced any recurrence of symptoms since then. He recalls a similar incident several years ago in [place], where he was at an altitude of 4000 to 5000 feet.    SOCIAL HISTORY  He is a  by profession but has been laid off for the winter and has been leading a sedentary lifestyle.    FAMILY HISTORY  His father  from aortic stenosis at the age of 85.        Review of Systems   Constitutional: Negative for malaise/fatigue.   Cardiovascular:  Negative for chest pain, claudication, dyspnea on exertion, leg swelling, near-syncope, orthopnea, palpitations, paroxysmal nocturnal dyspnea and syncope.   Respiratory:  Negative for shortness of breath.    Hematologic/Lymphatic: Does not bruise/bleed easily.        Otherwise complete ROS reviewed and negative except as mentioned in the HPI.      Past Medical History:   Past Medical History:   Diagnosis Date    Congenital heart disease     GERD (gastroesophageal reflux disease) 2021    Heart murmur     HL (hearing loss) 2020    Hypertension     Tinnitus 2000       Past Surgical History:History reviewed. No pertinent surgical history.    Family History: family history includes Cancer in his brother and mother; Heart disease in his father.    Social History:  reports that he has quit smoking. His smoking use included cigarettes. He has a 15 pack-year smoking history. He has never used smokeless tobacco. He reports that he does not currently use alcohol. He reports current drug use. Frequency: 7.00 times per week. Drug: Marijuana.    Medications:  Prior to Admission medications    Medication Sig Start Date End Date Taking? Authorizing Provider   amLODIPine (NORVASC) 5 MG tablet Take 1 tablet by mouth Daily. 24  Yes ProviderRicarda MD   fluticasone (FLONASE) 50 MCG/ACT nasal spray Administer 2 sprays into the nostril(s) as directed by provider 2 (Two) Times a Day. 25  Yes Roland Hassan Jr., MD  "  losartan (COZAAR) 100 MG tablet Take 1 tablet by mouth Daily. for blood pressure 11/12/24  Yes Provider, MD Ricarda   nicotine polacrilex (NICORETTE) 4 MG gum Chew 1 each As Needed for Smoking Cessation.   Yes Provider, MD Ricarda   omeprazole (priLOSEC) 40 MG capsule Take 1 capsule by mouth Daily. 11/12/24  Yes Provider, Ricarda, MD     Allergies:  Allergies   Allergen Reactions    Penicillins Rash       Objective     Vital Signs: /68   Pulse 108   Ht 185.4 cm (73\")   Wt 135 kg (298 lb)   SpO2 98%   BMI 39.32 kg/m²     Vitals and nursing note reviewed.   Constitutional:       General: Not in acute distress.     Appearance: Not in distress.   Neck:      Vascular: No JVD or JVR. JVD normal.   Pulmonary:      Effort: Pulmonary effort is normal.      Breath sounds: Normal breath sounds.   Cardiovascular:      Normal rate. Regular rhythm.      Murmurs: There is a grade 2/4 high frequency musical early diastolic murmur at the ULSB and URSB.      No gallop.  No rub.   Pulses:     Intact distal pulses.   Edema:     Peripheral edema absent.   Skin:     General: Skin is warm and dry.   Neurological:      Mental Status: Alert, oriented to person, place, and time and oriented to person, place and time.       Physical Exam      Results Reviewed:  Results        ECG 12 Lead    Date/Time: 2/24/2025 1:44 PM  Performed by: Tanvir Joya MD    Authorized by: Tanvir Joya MD  Comparison: compared with previous ECG from 6/11/2022  Similar to previous ECG  Rhythm: sinus tachycardia  Rate: tachycardic  BPM: 108  Conduction: left anterior fascicular block  Other findings: poor R wave progression    Clinical impression: abnormal EKG            No results found for: \"CHOL\", \"TRIG\", \"HDL\", \"VLDL\", \"LDLHDL\"  No results found for: \"HGBA1C\"    Results for orders placed during the hospital encounter of 01/03/25    Adult Transthoracic Echo Complete W/ Cont if Necessary Per Protocol    Interpretation Summary    Left " ventricular systolic function is low normal. Left ventricular ejection fraction appears to be 51 - 55%.    The left ventricular cavity is moderately dilated.    Left ventricular wall thickness is consistent with mild concentric hypertrophy.    Normal right ventricular cavity size and systolic function noted.    Moderate to severe aortic valve regurgitation is present.    Mild dilation of the aortic root is present.    Independent review of imaging: Since the echocardiogram report above was interpreted by another physician, I reviewed the images myself.  My interpretation is similar to what is presented; though aortic valve is not well-seen, I suspect it is bicuspid and has moderate to severe regurgitation.  There is a mild degree of aortopathy as well, with a mildly dilated aortic root.  There is low normal left ventricular systolic function, no other significant valvular pathology.  Assessment / Plan        Problem List Items Addressed This Visit          Cardiac and Vasculature    Primary hypertension: Established, stable.  Continue current treatment with losartan and amlodipine.    Nonrheumatic aortic valve insufficiency - Primary: New, further workup planned    Relevant Orders    CT Angio TAVR Chest Abdomen Pelvis With & Without Contrast    Aortic root dilatation: New, further workup planned    Relevant Orders    CT Angio TAVR Chest Abdomen Pelvis With & Without Contrast    LVE (left ventricular enlargement) new, no further workup planned at present       Assessment & Plan  1. Valvular heart disease.  Upon independent review of his previous echocardiogram, it remains uncertain whether the aortic valve is bicuspid or tricuspid. I do have a high index of suspicion that it is a bicuspid valve, given his relatively young age and the observed degree of regurgitation. The eccentricity of the regurgitation further supports this hypothesis. Additionally, the aortic root appears mildly enlarged, as per the echocardiogram  findings. He experienced a brief episode of decompensated congestive heart failure (CHF), likely due to the valvular disease, potentially exacerbated by high blood pressure during his time at altitude in [place] last year. He has been off daily diuretic therapy for over a month without any signs or symptoms of CHF recurrence, and he is euvolemic on examination today.   -No changes in therapy are recommended at this time.   -Emphasized the importance of maintaining good blood pressure control and monitoring his blood pressure more frequently than usual to ensure average systolic pressure remains in the 130s or less and diastolic pressure stays in the 80s or less.   -A CT TAVR protocol is suggested to definitively determine whether the valve is bicuspid, size the valve, and assess for any aortic pathology.     Further recommendations and plans will be provided upon reviewing the scan results.      Transcribed from ambient dictation for Tanvir Joya MD by Tanvir Joya MD.  02/24/25   13:48 CST    Patient or patient representative verbalized consent to the visit recording.

## 2025-04-25 ENCOUNTER — TELEPHONE (OUTPATIENT)
Dept: CARDIOLOGY | Facility: CLINIC | Age: 55
End: 2025-04-25
Payer: MEDICAID

## 2025-04-25 NOTE — TELEPHONE ENCOUNTER
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 So canceled testing as he asked

## 2025-04-28 ENCOUNTER — TELEPHONE (OUTPATIENT)
Dept: CARDIOLOGY | Facility: CLINIC | Age: 55
End: 2025-04-28
Payer: MEDICAID

## 2025-04-28 NOTE — TELEPHONE ENCOUNTER
Mr Bob,     Our office tried to contact you by phone about this message, but the call would not go through, and there was no answer at your emergency contact's number.      Dr Joya was notified by our scheduling department that you canceled the CTA TAVR for 5/16/2025. He responded as follows:     “Ok.  That's his choice, as I explained to him why we needed to obtain this scan.  Without it, we don't have any idea of the status of the condition for which he was referred to me.  As such, if he refuses the recommended treatment, no further scheduled follow-up with me should be made.  But he should be informed that this condition could become dangerous and life-threatening if not followed appropriately, and if he doesn't want to follow my recommendations, he should absolutely discuss further with his primary care provider and see if they'd like to refer him to anyone else.”     Please do not hesitate to contact our office with           any questions or concerns.

## (undated) DEVICE — BITE BLOCK ENDOSCP AD 60 FR W/ ADJ STRP PLAS GRN BLOX

## (undated) DEVICE — CANNULA NSL AD L7FT DIV O2 CO2 W/ M LUERLOCK TRMPT CONN

## (undated) DEVICE — FORCEPS BX 240CM 2.4MM L NDL RAD JAW 4 M00513334

## (undated) DEVICE — SINGLE PORT MANIFOLD: Brand: NEPTUNE 2

## (undated) DEVICE — ENDO KIT: Brand: MEDLINE INDUSTRIES, INC.